# Patient Record
(demographics unavailable — no encounter records)

---

## 2024-12-18 NOTE — PHYSICAL EXAM
[General Appearance - Alert] : alert [General Appearance - In No Acute Distress] : in no acute distress [Sclera] : the sclera and conjunctiva were normal [Neck Appearance] : the appearance of the neck was normal [] : no respiratory distress [Respiration, Rhythm And Depth] : normal respiratory rhythm and effort [Auscultation Breath Sounds / Voice Sounds] : lungs were clear to auscultation bilaterally [Heart Rate And Rhythm] : heart rate was normal and rhythm regular [Heart Sounds] : normal S1 and S2 [Murmurs] : no murmurs [Edema] : there was no peripheral edema [No CVA Tenderness] : no ~M costovertebral angle tenderness [Involuntary Movements] : no involuntary movements were seen [No Focal Deficits] : no focal deficits [Affect] : the affect was normal [Outer Ear] : the ears and nose were normal in appearance

## 2024-12-18 NOTE — HISTORY OF PRESENT ILLNESS
[FreeTextEntry1] : no c/o no sob,le edema granddaughter aiding in translation  complaint with all her medications main complaint of not sleeping  started on quietiapine  + uop  appetite and weight stable

## 2024-12-18 NOTE — ASSESSMENT
[FreeTextEntry1] : --------------------------------------- # CKD Stage IV. - off lisinopril, fu trend of UPC for now stable - updated UPC and UA pending  - renavite - cw atorvastatin  - HD modalities discussed including home HD/PD  in past visit with son, HCP    literature and resources given    would pursue hd if required,  advised to direct if PD would be considered so can plan for timely AV access placement.  granddaughter will dw her father, hcp regarding this   # proteinuria history  -  UPC 0.1 - on lisinopril,  # Secondary HPT  - medication compliance confirmed  - cw zemplar t, r, s, s  acceptable in past   fu trend   # hx hyperkalemia  # hx of metabolic acidosis  - cw sodium bicarbonate  # hx of hypercalcemia.  - excess supplements in past  - hosp at  8/2020  # Osteoporosis  - on Prolia in the past, last given in April 2020. - no updated injections planned   # htn  - controlled - amlodi[ine 2.5 mg qd - lisinopril 2.5 mg qd - echo  9/2023 ef 40-45%  #.Hx  of hypomagnesemia while on PPI.

## 2024-12-18 NOTE — REASON FOR VISIT
[Follow-Up] : a follow-up visit [Family Member] : family member [FreeTextEntry1] : CKD stage 4, proteinruia, sHPT, hyperkalemia, htn, hypercalcemia

## 2025-07-16 NOTE — HISTORY OF PRESENT ILLNESS
[FreeTextEntry1] : no c/o no sob,le edema daughter at appt aiding in translation  appetite variable , no metallic tast intake of liquids poor

## 2025-07-16 NOTE — ASSESSMENT
[FreeTextEntry1] : -------------------------------------- # CKD Stage IV. - OFF lisinopril, - inc hydration  - pt takes glucerna during day, electrolytes stable  - alb 3.3: advised to encourage adequate calorie and protein intake  - updated UPC and UA pending  - renavite - cw atorvastatin  - HD modalities discussed including home HD/PD  in past visit with son, HCP    plan remains for home dialysis : likely PD   # proteinuria history  -  UPC 0.2 in past, none today  - off lisinopril,  # Secondary HPT  - medication compliance confirmed  - cw zemplar t, r, s, s  acceptable in past - pth 36 '- po4 3.7   # hx hyperkalemia  # hx of metabolic acidosis  - cw sodium bicarbonate 325 qd  # hx of hypercalcemia.  - excess supplements in past  - hosp at  8/2020  # Osteoporosis  - on Prolia in the past, last given in April 2020. - no updated injections planned   # htn  - controlled - amlodipine 2.5 mg qd - echo  9/2023 ef 40-45%  #.Hx  of hypomagnesemia while on PPI.

## 2025-07-16 NOTE — PHYSICAL EXAM
[General Appearance - Alert] : alert [General Appearance - In No Acute Distress] : in no acute distress [Sclera] : the sclera and conjunctiva were normal [Outer Ear] : the ears and nose were normal in appearance [Neck Appearance] : the appearance of the neck was normal [] : no respiratory distress [Respiration, Rhythm And Depth] : normal respiratory rhythm and effort [Auscultation Breath Sounds / Voice Sounds] : lungs were clear to auscultation bilaterally [Heart Rate And Rhythm] : heart rate was normal and rhythm regular [Heart Sounds] : normal S1 and S2 [Murmurs] : no murmurs [Edema] : there was no peripheral edema [No CVA Tenderness] : no ~M costovertebral angle tenderness [Involuntary Movements] : no involuntary movements were seen [No Focal Deficits] : no focal deficits [Affect] : the affect was normal

## 2025-07-16 NOTE — CONSULT LETTER
[Dear  ___] : Dear  [unfilled], [Courtesy Letter:] : I had the pleasure of seeing your patient, [unfilled], in my office today. None